# Patient Record
Sex: FEMALE | Race: BLACK OR AFRICAN AMERICAN | NOT HISPANIC OR LATINO | URBAN - METROPOLITAN AREA
[De-identification: names, ages, dates, MRNs, and addresses within clinical notes are randomized per-mention and may not be internally consistent; named-entity substitution may affect disease eponyms.]

---

## 2022-08-20 ENCOUNTER — EMERGENCY (EMERGENCY)
Facility: HOSPITAL | Age: 42
LOS: 1 days | Discharge: HOME | End: 2022-08-20
Admitting: EMERGENCY MEDICINE

## 2022-08-20 VITALS
TEMPERATURE: 97 F | RESPIRATION RATE: 18 BRPM | OXYGEN SATURATION: 95 % | HEART RATE: 130 BPM | SYSTOLIC BLOOD PRESSURE: 142 MMHG | WEIGHT: 199.96 LBS | DIASTOLIC BLOOD PRESSURE: 89 MMHG

## 2022-08-20 VITALS — HEART RATE: 90 BPM

## 2022-08-20 DIAGNOSIS — R00.2 PALPITATIONS: ICD-10-CM

## 2022-08-20 DIAGNOSIS — F41.9 ANXIETY DISORDER, UNSPECIFIED: ICD-10-CM

## 2022-08-20 PROCEDURE — 93010 ELECTROCARDIOGRAM REPORT: CPT

## 2022-08-20 PROCEDURE — 99284 EMERGENCY DEPT VISIT MOD MDM: CPT

## 2022-08-20 NOTE — ED PROVIDER NOTE - CLINICAL SUMMARY MEDICAL DECISION MAKING FREE TEXT BOX
pt presents with c/o anxiety after taking THC edible. HR initially 130 but improved to 90s. EKG sinus. symptoms improved with metabolism. will d/c.

## 2022-08-20 NOTE — ED PROVIDER NOTE - OBJECTIVE STATEMENT
40yo F presents with c/o anxiety and palpitations after taking an edible marijuana cookie. denies any chest pain, hallucinations, sob, dizziness, any other concerns. reports symptoms have improved while being in ED.

## 2022-08-20 NOTE — ED ADULT NURSE NOTE - OBJECTIVE STATEMENT
as above , male person with pt, at times pt sitting still and other times pt appearing anxious asking for help to anyone in general , nil obvious injury to person

## 2022-08-20 NOTE — ED PROVIDER NOTE - PATIENT PORTAL LINK FT
You can access the FollowMyHealth Patient Portal offered by [facility] by registering at the following website: [email or web address]. By joining [employer]’s FollowMyHealth portal, you will also be able to view your health information using other applications (apps) compatible with our system.